# Patient Record
Sex: MALE | Race: WHITE | NOT HISPANIC OR LATINO | URBAN - NONMETROPOLITAN AREA
[De-identification: names, ages, dates, MRNs, and addresses within clinical notes are randomized per-mention and may not be internally consistent; named-entity substitution may affect disease eponyms.]

---

## 2022-08-31 ENCOUNTER — CLAIMS CREATED FROM THE CLAIM WINDOW (OUTPATIENT)
Dept: URBAN - NONMETROPOLITAN AREA CLINIC 2 | Facility: CLINIC | Age: 47
End: 2022-08-31
Payer: MEDICARE

## 2022-08-31 ENCOUNTER — WEB ENCOUNTER (OUTPATIENT)
Dept: URBAN - NONMETROPOLITAN AREA CLINIC 2 | Facility: CLINIC | Age: 47
End: 2022-08-31

## 2022-08-31 ENCOUNTER — DASHBOARD ENCOUNTERS (OUTPATIENT)
Age: 47
End: 2022-08-31

## 2022-08-31 ENCOUNTER — LAB OUTSIDE AN ENCOUNTER (OUTPATIENT)
Dept: URBAN - NONMETROPOLITAN AREA CLINIC 2 | Facility: CLINIC | Age: 47
End: 2022-08-31

## 2022-08-31 VITALS
BODY MASS INDEX: 49.44 KG/M2 | SYSTOLIC BLOOD PRESSURE: 154 MMHG | DIASTOLIC BLOOD PRESSURE: 96 MMHG | HEART RATE: 69 BPM | TEMPERATURE: 97 F | HEIGHT: 67 IN | WEIGHT: 315 LBS

## 2022-08-31 DIAGNOSIS — K74.69 CIRRHOSIS, CRYPTOGENIC: ICD-10-CM

## 2022-08-31 DIAGNOSIS — K74.60 HEPATIC CIRRHOSIS, UNSPECIFIED HEPATIC CIRRHOSIS TYPE, UNSPECIFIED WHETHER ASCITES PRESENT: ICD-10-CM

## 2022-08-31 PROBLEM — 19943007: Status: ACTIVE | Noted: 2022-08-31

## 2022-08-31 LAB
A/G RATIO: 0.8
ALBUMIN: 3.3
ALKALINE PHOSPHATASE: 58
ALT (SGPT): 14
ANION GAP: 14
AST (SGOT): 22
BILIRUBIN TOTAL: 1.1
BLOOD UREA NITROGEN: 38
BUN / CREAT RATIO: 19
CALCIUM: 8.6
CHLORIDE: 112
CO2: 20
CREATININE, SERUM: 2.01
EGFR (CKD-EPI): 40
EOSINOPHILS - MAN (DIFF): 3
EOSINOPHILS MANUAL ABSOLUTE COUNT PAR: 0.06
GLUCOSE: 112
HEMATOCRIT: 24.5
HEMOGLOBIN: 8.1
HEPATITIS B SURFACE ANTIGEN: (no result)
HEPATITIS C ANTIBODY: (no result)
IRON SATURATION: 26
IRON: 99
LYMPHOCYTES - MAN (DIFF): 18
LYMPHS MANUAL ABSOLUTE PAR: 0.39
MEAN CORPUSCULAR HEMOGLOBIN CONC: 33.1
MEAN CORPUSCULAR HEMOGLOBIN: 30.3
MEAN CORPUSCULAR VOLUME: 91.5
MEAN PLATELET VOLUME: 8.2
MONOCYTES - MAN (DIFF): 8
MONOCYTES MANUAL ABSOLUTE COUNT PAR: 0.16
NEUTROPHILS - MAN (DIFF): 71
NEUTROPHILS MANUAL ABSOLUTE COUNT PAR: 1.49
PAR PLATELET MORPHOLOGY: (no result)
PAR RBC MORPHOLOGY: (no result)
PLATELET COUNT: 71
POTASSIUM: 4.4
PROTEIN TOTAL: 7.3
RED BLOOD CELL COUNT: 2.68
RED CELL DISTRIBUTION WIDTH: 15
SODIUM: 142
TOTAL IRON BINDING CAPACITY: 384
UNSATURATED IRON BINDING CAPACITY: 285
WHITE BLOOD CELL COUNT: 2.1

## 2022-08-31 PROCEDURE — 91200 LIVER ELASTOGRAPHY: CPT | Performed by: INTERNAL MEDICINE

## 2022-08-31 PROCEDURE — 99204 OFFICE O/P NEW MOD 45 MIN: CPT | Performed by: INTERNAL MEDICINE

## 2022-08-31 PROCEDURE — 99254 IP/OBS CNSLTJ NEW/EST MOD 60: CPT | Performed by: INTERNAL MEDICINE

## 2022-08-31 RX ORDER — DIPHENHYDRAMINE HCL 2 %
CREAM (GRAM) TOPICAL
Qty: 0 | Refills: 0 | Status: ACTIVE | COMMUNITY
Start: 1900-01-01

## 2022-08-31 RX ORDER — OMEPRAZOLE 20 MG/1
1 CAPSULE 30 MINUTES BEFORE MORNING MEAL CAPSULE, DELAYED RELEASE ORAL ONCE A DAY
Status: ACTIVE | COMMUNITY

## 2022-08-31 RX ORDER — SPIRONOLACTONE 25 MG/1
TAKE 2 TABLETS (50 MG) BY ORAL ROUTE 2 TIMES PER DAY FOR 30 DAYS TABLET, FILM COATED ORAL 2
Qty: 120 | Refills: 3 | Status: ACTIVE | COMMUNITY
Start: 2018-02-20

## 2022-08-31 RX ORDER — FUROSEMIDE 40 MG/1
TAKE 1 TABLET (40 MG) BY ORAL ROUTE ONCE DAILY FOR 30 DAYS TABLET ORAL 1
Qty: 30 | Refills: 3 | Status: ACTIVE | COMMUNITY
Start: 2018-02-20

## 2022-08-31 NOTE — HPI-TODAY'S VISIT:
Mr. Cano is a very pleasant 46-year-old gentleman referred by Bailey Van for cirrhosis.  A copy of records will be sent to her office.  I saw him approximately 5 years ago at that time we noted that he had cirrhosis.  Since that time he has been seeing Dr. Villalba.  He is not happy with their care.  He is moving back to see us.  He is also seen Rubén Collazo for lower extremity lymphedema.  He is scheduled for a nephrology appointment due to CKD as well as trying to get set up for a CT scan to evaluate his abdomen.  Dr. Villalba did panendoscopy which showed varices.  He placed him on propanolol.  In addition he had a colonoscopy with some polyps.  He is not due for colonoscopy yet.  He continues to have ascites.  He denies any bleeding.  He struggles with weight due to his mobility issues as well as back pain/past back interventions.  He is maintaining low-sodium diet per his report

## 2022-09-02 LAB
HEPATITIS B SURFACE ANTIGEN: NONREACTIVE
SMOOTH MUSCLE ANTIBODY: 27

## 2022-09-05 LAB — MITOCHONDRIA M2 ANTIBODY (IGG): <=20

## 2022-09-07 LAB — AFP-TUMOR MARKER: 2.4

## 2022-09-08 LAB — ANA SCREEN IFA: NEGATIVE

## 2022-09-16 ENCOUNTER — TELEPHONE ENCOUNTER (OUTPATIENT)
Dept: URBAN - NONMETROPOLITAN AREA CLINIC 2 | Facility: CLINIC | Age: 47
End: 2022-09-16

## 2023-01-11 ENCOUNTER — OFFICE VISIT (OUTPATIENT)
Dept: URBAN - NONMETROPOLITAN AREA CLINIC 2 | Facility: CLINIC | Age: 48
End: 2023-01-11

## 2024-09-12 ENCOUNTER — LAB OUTSIDE AN ENCOUNTER (OUTPATIENT)
Dept: URBAN - NONMETROPOLITAN AREA CLINIC 13 | Facility: CLINIC | Age: 49
End: 2024-09-12

## 2024-09-12 ENCOUNTER — OFFICE VISIT (OUTPATIENT)
Dept: URBAN - NONMETROPOLITAN AREA CLINIC 13 | Facility: CLINIC | Age: 49
End: 2024-09-12
Payer: COMMERCIAL

## 2024-09-12 VITALS
DIASTOLIC BLOOD PRESSURE: 72 MMHG | HEIGHT: 67 IN | WEIGHT: 315 LBS | HEART RATE: 65 BPM | BODY MASS INDEX: 49.44 KG/M2 | SYSTOLIC BLOOD PRESSURE: 163 MMHG

## 2024-09-12 DIAGNOSIS — K74.69 CIRRHOSIS, CRYPTOGENIC: ICD-10-CM

## 2024-09-12 PROCEDURE — 99213 OFFICE O/P EST LOW 20 MIN: CPT | Performed by: INTERNAL MEDICINE

## 2024-09-12 RX ORDER — FERROUS SULFATE 325(65) MG
1 TABLET TABLET ORAL
Status: ACTIVE | COMMUNITY

## 2024-09-12 RX ORDER — LISINOPRIL 10 MG/1
1 TABLET TABLET ORAL ONCE A DAY
Status: ACTIVE | COMMUNITY

## 2024-09-12 RX ORDER — CITALOPRAM 20 MG/1
1 TABLET TABLET, FILM COATED ORAL ONCE A DAY
Status: ACTIVE | COMMUNITY

## 2024-09-12 RX ORDER — FUROSEMIDE 40 MG/1
TAKE 1 TABLET (40 MG) BY ORAL ROUTE ONCE DAILY FOR 30 DAYS TABLET ORAL 1
Qty: 30 | Refills: 3 | Status: ACTIVE | COMMUNITY
Start: 2018-02-20

## 2024-09-12 RX ORDER — FUROSEMIDE 40 MG/1
1 TABLET TABLET ORAL ONCE A DAY
Status: ACTIVE | COMMUNITY

## 2024-09-12 RX ORDER — DIPHENHYDRAMINE HCL 2 %
CREAM (GRAM) TOPICAL
Qty: 0 | Refills: 0 | Status: ACTIVE | COMMUNITY
Start: 1900-01-01

## 2024-09-12 RX ORDER — SPIRONOLACTONE 25 MG/1
TAKE 2 TABLETS (50 MG) BY ORAL ROUTE 2 TIMES PER DAY FOR 30 DAYS TABLET, FILM COATED ORAL 2
Qty: 120 | Refills: 3 | Status: ACTIVE | COMMUNITY
Start: 2018-02-20

## 2024-09-12 RX ORDER — OMEPRAZOLE 20 MG/1
1 CAPSULE 30 MINUTES BEFORE MORNING MEAL CAPSULE, DELAYED RELEASE ORAL ONCE A DAY
Status: ACTIVE | COMMUNITY

## 2024-09-27 ENCOUNTER — OFFICE VISIT (OUTPATIENT)
Dept: URBAN - METROPOLITAN AREA MEDICAL CENTER 1 | Facility: MEDICAL CENTER | Age: 49
End: 2024-09-27

## 2024-10-25 ENCOUNTER — OFFICE VISIT (OUTPATIENT)
Dept: URBAN - METROPOLITAN AREA MEDICAL CENTER 1 | Facility: MEDICAL CENTER | Age: 49
End: 2024-10-25

## 2024-10-25 RX ORDER — FUROSEMIDE 40 MG/1
1 TABLET TABLET ORAL ONCE A DAY
Status: ACTIVE | COMMUNITY

## 2024-10-25 RX ORDER — OMEPRAZOLE 20 MG/1
1 CAPSULE 30 MINUTES BEFORE MORNING MEAL CAPSULE, DELAYED RELEASE ORAL ONCE A DAY
Status: ACTIVE | COMMUNITY

## 2024-10-25 RX ORDER — DIPHENHYDRAMINE HCL 2 %
CREAM (GRAM) TOPICAL
Qty: 0 | Refills: 0 | Status: ACTIVE | COMMUNITY
Start: 1900-01-01

## 2024-10-25 RX ORDER — CITALOPRAM 20 MG/1
1 TABLET TABLET, FILM COATED ORAL ONCE A DAY
Status: ACTIVE | COMMUNITY

## 2024-10-25 RX ORDER — FERROUS SULFATE 325(65) MG
1 TABLET TABLET ORAL
Status: ACTIVE | COMMUNITY

## 2024-10-25 RX ORDER — SPIRONOLACTONE 25 MG/1
TAKE 2 TABLETS (50 MG) BY ORAL ROUTE 2 TIMES PER DAY FOR 30 DAYS TABLET, FILM COATED ORAL 2
Qty: 120 | Refills: 3 | Status: ACTIVE | COMMUNITY
Start: 2018-02-20

## 2024-10-25 RX ORDER — FUROSEMIDE 40 MG/1
TAKE 1 TABLET (40 MG) BY ORAL ROUTE ONCE DAILY FOR 30 DAYS TABLET ORAL 1
Qty: 30 | Refills: 3 | Status: ACTIVE | COMMUNITY
Start: 2018-02-20

## 2024-10-25 RX ORDER — LISINOPRIL 10 MG/1
1 TABLET TABLET ORAL ONCE A DAY
Status: ACTIVE | COMMUNITY

## 2024-11-05 ENCOUNTER — LAB OUTSIDE AN ENCOUNTER (OUTPATIENT)
Dept: URBAN - NONMETROPOLITAN AREA CLINIC 13 | Facility: CLINIC | Age: 49
End: 2024-11-05

## 2024-11-05 ENCOUNTER — OFFICE VISIT (OUTPATIENT)
Dept: URBAN - NONMETROPOLITAN AREA CLINIC 13 | Facility: CLINIC | Age: 49
End: 2024-11-05
Payer: COMMERCIAL

## 2024-11-05 VITALS
SYSTOLIC BLOOD PRESSURE: 127 MMHG | BODY MASS INDEX: 48.34 KG/M2 | HEIGHT: 67 IN | HEART RATE: 79 BPM | WEIGHT: 308 LBS | DIASTOLIC BLOOD PRESSURE: 74 MMHG

## 2024-11-05 DIAGNOSIS — K64.4 BLEEDING EXTERNAL HEMORRHOIDS: ICD-10-CM

## 2024-11-05 DIAGNOSIS — K74.60 HEPATIC CIRRHOSIS, UNSPECIFIED HEPATIC CIRRHOSIS TYPE, UNSPECIFIED WHETHER ASCITES PRESENT: ICD-10-CM

## 2024-11-05 DIAGNOSIS — Z09 ENCOUNTER FOR FOLLOW-UP: ICD-10-CM

## 2024-11-05 DIAGNOSIS — Z86.0100 HISTORY OF COLON POLYPS: ICD-10-CM

## 2024-11-05 PROBLEM — 389026000: Status: ACTIVE | Noted: 2024-11-05

## 2024-11-05 PROBLEM — 26421009: Status: ACTIVE | Noted: 2024-11-05

## 2024-11-05 PROBLEM — 14223005: Status: ACTIVE | Noted: 2024-11-05

## 2024-11-05 PROCEDURE — 99214 OFFICE O/P EST MOD 30 MIN: CPT | Performed by: NURSE PRACTITIONER

## 2024-11-05 RX ORDER — SPIRONOLACTONE 25 MG/1
TAKE 2 TABLETS (50 MG) BY ORAL ROUTE 2 TIMES PER DAY FOR 30 DAYS TABLET, FILM COATED ORAL 2
Qty: 120 | Refills: 3 | Status: ACTIVE | COMMUNITY
Start: 2018-02-20

## 2024-11-05 RX ORDER — FERROUS SULFATE 325(65) MG
1 TABLET TABLET ORAL
Status: ACTIVE | COMMUNITY

## 2024-11-05 RX ORDER — FUROSEMIDE 40 MG/1
TAKE 1 TABLET (40 MG) BY ORAL ROUTE ONCE DAILY FOR 30 DAYS TABLET ORAL 1
Qty: 30 | Refills: 3 | Status: ACTIVE | COMMUNITY
Start: 2018-02-20

## 2024-11-05 RX ORDER — DIPHENHYDRAMINE HCL 2 %
CREAM (GRAM) TOPICAL
Qty: 0 | Refills: 0 | Status: ACTIVE | COMMUNITY
Start: 1900-01-01

## 2024-11-05 RX ORDER — LISINOPRIL 10 MG/1
1 TABLET TABLET ORAL ONCE A DAY
Status: ACTIVE | COMMUNITY

## 2024-11-05 RX ORDER — CITALOPRAM 20 MG/1
1 TABLET TABLET, FILM COATED ORAL ONCE A DAY
Status: ACTIVE | COMMUNITY

## 2024-11-05 RX ORDER — OMEPRAZOLE 20 MG/1
1 CAPSULE 30 MINUTES BEFORE MORNING MEAL CAPSULE, DELAYED RELEASE ORAL ONCE A DAY
Status: ACTIVE | COMMUNITY

## 2024-11-05 RX ORDER — FUROSEMIDE 40 MG/1
1 TABLET TABLET ORAL ONCE A DAY
Status: ACTIVE | COMMUNITY

## 2024-11-05 NOTE — HPI-OTHER HISTORIES
9/12/2024 Mr. Cano is a very pleasant 46-year-old gentleman referred by Bailey Van for cirrhosis. A copy of records will be sent to her office. I saw him approximately 5 years ago at that time we noted that he had cirrhosis. Since that time he has been seeing Dr. Villalba. He is not happy with their care. He is moving back to see us. He is also seen uRbén Collazo for lower extremity lymphedema. He is scheduled for a nephrology appointment due to CKD as well as trying to get set up for a CT scan to evaluate his abdomen. Dr. Villalba did panendoscopy which showed varices. He placed him on propanolol. In addition he had a colonoscopy with some polyps. He is not due for colonoscopy yet. He continues to have ascites. He denies any bleeding. He struggles with weight due to his mobility issues as well as back pain/past back interventions. He is maintaining low-sodium diet per his report  10/25/2024 EGD: no varices

## 2024-11-05 NOTE — HPI-TODAY'S VISIT:
11/5/2024 Mr. Jacinto Cano is a 49 year old male here for f/u of MASH cirrhosis. He had an EGD 10/2024 negative for varices. He is on carvidolol. He has a hx of ascites with need for paracentesis in the past. His diurectics are currently managed by nephrology and taking only 100mg of aldactone. He has a 40mg lasix to take if his weight goes up. He is following a low sodium diet. He denies any bleeding. He is followed by Dr Austin and his Hbg 11/4 was 11.3 with 95k platelet count. He denies any hx of HE. He is due for HCC screening. Since his last OV, he was very fatigued and had a hrt rate in the 20's. He got a pacemaker. This has resolved. CS

## 2025-01-03 ENCOUNTER — OFFICE VISIT (OUTPATIENT)
Dept: URBAN - METROPOLITAN AREA MEDICAL CENTER 1 | Facility: MEDICAL CENTER | Age: 50
End: 2025-01-03

## 2025-05-06 ENCOUNTER — OFFICE VISIT (OUTPATIENT)
Dept: URBAN - NONMETROPOLITAN AREA CLINIC 13 | Facility: CLINIC | Age: 50
End: 2025-05-06

## 2025-06-26 ENCOUNTER — LAB OUTSIDE AN ENCOUNTER (OUTPATIENT)
Dept: URBAN - NONMETROPOLITAN AREA CLINIC 13 | Facility: CLINIC | Age: 50
End: 2025-06-26

## 2025-06-26 ENCOUNTER — LAB OUTSIDE AN ENCOUNTER (OUTPATIENT)
Dept: URBAN - NONMETROPOLITAN AREA CLINIC 2 | Facility: CLINIC | Age: 50
End: 2025-06-26

## 2025-06-26 ENCOUNTER — OFFICE VISIT (OUTPATIENT)
Dept: URBAN - NONMETROPOLITAN AREA CLINIC 13 | Facility: CLINIC | Age: 50
End: 2025-06-26
Payer: MEDICARE

## 2025-06-26 DIAGNOSIS — Z12.11 COLON CANCER SCREENING: ICD-10-CM

## 2025-06-26 DIAGNOSIS — R18.8 OTHER ASCITES: ICD-10-CM

## 2025-06-26 DIAGNOSIS — K64.4 BLEEDING EXTERNAL HEMORRHOIDS: ICD-10-CM

## 2025-06-26 DIAGNOSIS — I85.10 SECONDARY ESOPHAGEAL VARICES WITHOUT BLEEDING: ICD-10-CM

## 2025-06-26 DIAGNOSIS — K74.60 HEPATIC CIRRHOSIS, UNSPECIFIED HEPATIC CIRRHOSIS TYPE, UNSPECIFIED WHETHER ASCITES PRESENT: ICD-10-CM

## 2025-06-26 LAB
A/G RATIO: 0.9
ALBUMIN: 3.7
ALKALINE PHOSPHATASE: 60
ALT (SGPT): 17
ANION GAP: 13
AST (SGOT): 28
BASOPHILS AUTOMATED ABSOLUTE COUNT: 0
BASOPHILS RELATIVE PERCENT: 0.8
BILIRUBIN TOTAL: 0.8
BLOOD UREA NITROGEN: 38
BUN / CREAT RATIO: 24
CALCIUM: 8.5
CHLORIDE: 111
CO2: 16
CREATININE, SERUM: 1.61
EGFR (CKD-EPI): 51
EOSINOPHILS AUTOMATED ABSOLUTE COUNT: 0.2
EOSINOPHILS RELATIVE PERCENT: 4.8
GLUCOSE: 112
HEMATOCRIT: 32.5
HEMOGLOBIN: 11.4
INR: 1.18
LYMPHOCYTES AUTOMATED ABSOLUTE COUNT: 1.1
LYMPHOCYTES RELATIVE PERCENT: 22.4
MEAN CORPUSCULAR HEMOGLOBIN CONC: 35
MEAN CORPUSCULAR HEMOGLOBIN: 33
MEAN CORPUSCULAR VOLUME: 94.3
MEAN PLATELET VOLUME: 8.1
MONOCYTES AUTOMATED ABSOLUTE COUNT: 0.5
MONOCYTES RELATIVE PERCENT: 9.2
NEUTROPHILS AUTOMATED ABSOLUTE: 3.1
NEUTROPHILS RELATIVE PERCENT: 62.8
PLATELET COUNT: 100
POTASSIUM: 4.9
PROTEIN TOTAL: 7.6
PROTHROMBIN TIME: 13.2
RED BLOOD CELL COUNT: 3.44
RED CELL DISTRIBUTION WIDTH: 13.6
SODIUM: 135
WHITE BLOOD CELL COUNT: 5

## 2025-06-26 PROCEDURE — 99214 OFFICE O/P EST MOD 30 MIN: CPT | Performed by: NURSE PRACTITIONER

## 2025-06-26 RX ORDER — CARVEDILOL 25 MG/1
1 TABLET WITH FOOD TABLET, FILM COATED ORAL TWICE A DAY
Status: ACTIVE | COMMUNITY

## 2025-06-26 RX ORDER — OMEPRAZOLE 20 MG/1
1 CAPSULE 30 MINUTES BEFORE MORNING MEAL CAPSULE, DELAYED RELEASE ORAL ONCE A DAY
Status: ACTIVE | COMMUNITY

## 2025-06-26 RX ORDER — CITALOPRAM 20 MG/1
1 TABLET TABLET, FILM COATED ORAL ONCE A DAY
Status: ACTIVE | COMMUNITY

## 2025-06-26 RX ORDER — SPIRONOLACTONE 25 MG/1
TAKE 2 TABLETS (50 MG) BY ORAL ROUTE 2 TIMES PER DAY FOR 30 DAYS TABLET, FILM COATED ORAL 2
Qty: 120 | Refills: 3 | Status: ON HOLD | COMMUNITY
Start: 2018-02-20

## 2025-06-26 RX ORDER — SPIRONOLACTONE 100 MG/1
1 TABLET TABLET, FILM COATED ORAL ONCE A DAY
Status: ACTIVE | COMMUNITY

## 2025-06-26 RX ORDER — FUROSEMIDE 40 MG/1
1 TABLET TABLET ORAL ONCE A DAY
Status: ACTIVE | COMMUNITY

## 2025-06-26 RX ORDER — FERROUS SULFATE 325(65) MG
1 TABLET TABLET ORAL
Status: ACTIVE | COMMUNITY

## 2025-06-26 RX ORDER — DIPHENHYDRAMINE HCL 2 %
CREAM (GRAM) TOPICAL
Qty: 0 | Refills: 0 | Status: ACTIVE | COMMUNITY
Start: 1900-01-01

## 2025-06-26 RX ORDER — LISINOPRIL 10 MG/1
1 TABLET TABLET ORAL ONCE A DAY
Status: ACTIVE | COMMUNITY

## 2025-06-26 RX ORDER — FUROSEMIDE 40 MG/1
TAKE 1 TABLET (40 MG) BY ORAL ROUTE ONCE DAILY FOR 30 DAYS TABLET ORAL 1
Qty: 30 | Refills: 3 | Status: ACTIVE | COMMUNITY
Start: 2018-02-20

## 2025-06-26 NOTE — HPI-OTHER HISTORIES
9/12/2024 Mr. Cano is a very pleasant 46-year-old gentleman referred by Bailey Van for cirrhosis. A copy of records will be sent to her office. I saw him approximately 5 years ago at that time we noted that he had cirrhosis. Since that time he has been seeing Dr. Villalba. He is not happy with their care. He is moving back to see us. He is also seen Rubén Collazo for lower extremity lymphedema. He is scheduled for a nephrology appointment due to CKD as well as trying to get set up for a CT scan to evaluate his abdomen. Dr. Villalba did panendoscopy which showed varices. He placed him on propanolol. In addition he had a colonoscopy with some polyps. He is not due for colonoscopy yet. He continues to have ascites. He denies any bleeding. He struggles with weight due to his mobility issues as well as back pain/past back interventions. He is maintaining low-sodium diet per his report  10/25/2024 EGD: no varices  11/5/2024 Mr. Jacinto Cano is a 49 year old male here for f/u of MASH cirrhosis. He had an EGD 10/2024 negative for varices. He is on carvidolol. He has a hx of ascites with need for paracentesis in the past. His diurectics are currently managed by nephrology and taking only 100mg of aldactone. He has a 40mg lasix to take if his weight goes up. He is following a low sodium diet. He denies any bleeding. He is followed by Dr Austin and his Hbg 11/4 was 11.3 with 95k platelet count. He denies any hx of HE. He is due for HCC screening. Since his last OV, he was very fatigued and had a hrt rate in the 20's. He got a pacemaker. This has resolved. CS

## 2025-06-26 NOTE — HPI-TODAY'S VISIT:
6/26/2025 Mr. Jacinto Cano is a 49 year old male here for f/u of MASH cirrhosis. He had an EGD 10/2024 negative for varices. He is on carvidolol. He has a hx of ascites with need for paracentesis in the past. His diurectics are currently managed by nephrology and taking only 100mg of aldactone. He has a 40mg lasix to take if his weight goes up. He is following a low sodium diet. He has had some bloating through out the day but no SOB. He denies any bleeding. He is followed by Dr Austin. He has not seen in months. He denies any hx of HE. He is due for HCC screening- MRI 11/2024 negative. CS

## 2025-06-26 NOTE — PHYSICAL EXAM HENT:
Head,  normocephalic,  atraumatic,  Face,  Face within normal limits,  Ears,  External ears within normal limits,  Nose/Nasopharynx,  External nose  normal appearance,  Lips,  Appearance normal 10

## 2025-08-13 ENCOUNTER — TELEPHONE ENCOUNTER (OUTPATIENT)
Dept: URBAN - NONMETROPOLITAN AREA CLINIC 2 | Facility: CLINIC | Age: 50
End: 2025-08-13

## 2025-08-13 ENCOUNTER — LAB OUTSIDE AN ENCOUNTER (OUTPATIENT)
Dept: URBAN - NONMETROPOLITAN AREA CLINIC 2 | Facility: CLINIC | Age: 50
End: 2025-08-13